# Patient Record
(demographics unavailable — no encounter records)

---

## 2019-06-25 NOTE — XRAY
Indication: Pain following lifting injury.



Comparison: None



2 views of the right humerus demonstrates mild/moderate AC degenerative

arthropathy.  No other bony, articular, or soft tissue abnormalities.

## 2019-06-25 NOTE — ERPHSYRPT
- History of Present Illness


Time Seen by Provider: 06/25/19 14:10


Source: patient


Exam Limitations: no limitations


Patient Subjective Stated Complaint: Pt states "I had surgery on my right elbow 

a month ago and it hurts on my right shoulder and my right elbow.  I took a 

norco last night and now  I cannot stay awake, I am tired.  I did not sleep at 

all last night."


Triage Nursing Assessment: Pt presented lethargic, placed in room 4, pt FSBS 77 

upon arrival.  Pt able to speak in clear full sentences. Pt closes eyes and 

sleeps between questions.


Physician History: 





56-year-old white male who states he had surgery on his right elbow recently he 

arrives with complaint of pain in his right shoulder right humerus states it 

began yesterday lifting up a garage door.


Pain is worse with movement.





Patient does appear to have somewhat slurred speech he states that this is 

normal for him he states he gets this way whenever he takes pain medications.


He states he does not want his somnolence worked up.  Is not having shortness 

of breath or any other problems.





He does state she took a Norco yesterday this is highly ask when he takes Norco.





Past medical history includes cataracts, diabetes type 1, COPD, hyperlipidemia, 

high blood pressure, myocardial infarction, GERD, anxiety, depression.





Past surgical history includes cholecystectomy, hernia, cardiac stent, left 

elbow surgery, right elbow surgery.








Timing/Duration: today


Severity: moderate


Modifying Factors: Improves With: nothing


Associated Symptoms: No nausea, No vomiting, No abdominal pain, No shortness of 

breath, No heartburn, No diaphoresis, No cough, No chills, No chest pain, No 

fever, No headaches, No loss of appetite, No malaise, No rash, No syncope, No 

seizure, No weakness


Allergies/Adverse Reactions: 








Penicillins Allergy (Severe, Verified 06/25/19 14:00)


 Swelling


ondansetron [From Zofran] Allergy (Intermediate, Verified 06/25/19 14:00)


 Headache


tramadol Adverse Reaction (Severe, Verified 06/25/19 14:00)


 hallucinate


codeine Adverse Reaction (Mild, Verified 06/25/19 14:00)


 abdominal pain





Home Medications: 








Atorvastatin Calcium 80 mg PO DAILY 06/25/19 [History]


Carvedilol 12.5 mg PO DAILY 06/25/19 [History]


Ergocalciferol (Vitamin D2) [Vitamin D] 50,000 unit PO WEEKLY 06/25/19 [History]


Gabapentin 400 mg PO DAILY 06/25/19 [History]


Linagliptin [Tradjenta] 5 mg PO DAILY 06/25/19 [History]


Liraglutide [Victoza 2-Miguel] 1.8 mg SQ DAILY 06/25/19 [History]


PANTOPRAZOLE 40 mg Tablet*** [Protonix 40MG Tablet***] 40 mg PO DAILY 06/25/19 [

History]





Hx Tetanus, Diphtheria Vaccination/Date Given: Yes


Hx Influenza Vaccination/Date Given: Yes


Hx Pneumococcal Vaccination/Date Given: No


Immunizations Up to Date: Yes





- Review of Systems


Constitutional: No Fever, No Chills


Eyes: No Symptoms


Ears, Nose, & Throat: No Symptoms


Respiratory: No Cough, No Dyspnea


Cardiac: No Chest Pain, No Edema, No Syncope


Abdominal/Gastrointestinal: No Abdominal Pain, No Nausea, No Vomiting, No 

Diarrhea


Genitourinary Symptoms: No Dysuria


Musculoskeletal: Other (right shoulder and arm pain)


Skin: No Rash


Neurological: No Dizziness, No Focal Weakness, No Sensory Changes


Psychological: No Symptoms


Endocrine: No Symptoms


All Other Systems: Reviewed and Negative





- Past Medical History


Pertinent Past Medical History: Yes


Neurological History: No Pertinent History


ENT History: Cataracts


Cardiac History: High Cholesterol, Hypertension, Myocardial Infarction (MI)


Respiratory History: COPD


Endocrine Medical History: Diabetes Type I


Musculoskeletal History: No Pertinent History


GI Medical History: GERD


 History: No Pertinent History


Psycho-Social History: Anxiety, Depression


Male Reproductive Disorders: No Pertinent History





- Past Surgical History


Past Surgical History: Yes


Other Surgical History: windy.  hernia.  cardiac stents.  left elbow.  right 

elbow





- Social History


Smoking Status: Current every day smoker


How long have you smoked: years


Exposure to second hand smoke: Yes


Drug Use: none


Patient Lives Alone: No





- Nursing Vital Signs


Nursing Vital Signs: 


 Initial Vital Signs











Temperature  98.8 F   06/25/19 13:52


 


Pulse Rate  80   06/25/19 13:52


 


Respiratory Rate  16   06/25/19 13:52


 


Blood Pressure  162/102   06/25/19 13:52


 


O2 Sat by Pulse Oximetry  92 L  06/25/19 13:52








 Pain Scale











Pain Intensity                 10

















- Physical Exam


General Appearance: other (Well-developed well-nourished white male somewhat 

slurred speech alert answers questions well oriented x3 GCS equals 15)


Eye Exam: PERRL/EOMI, eyes nml inspection


Ears, Nose, Throat Exam: normal ENT inspection, TMs normal, pharynx normal, 

moist mucous membranes


Neck Exam: normal inspection, non-tender, supple, full range of motion


Respiratory Exam: normal breath sounds, lungs clear, No respiratory distress


Cardiovascular Exam: regular rate/rhythm, normal heart sounds, normal 

peripheral pulses, capillary refill <2 sec


Gastrointestinal/Abdomen Exam: soft, normal bowel sounds, No tenderness, No mass


Back Exam: normal inspection, normal range of motion, No CVA tenderness, No 

vertebral tenderness


Extremity Exam: No normal inspection (patient with pain with palpation right 

shoulder and right upper arm decreased range of motion right  upper extremity 

secondary to right shoulder pain, radial and hallmark pulses intact two over 

four good capillary refill right fingers sensation intact right fingers)


Neurologic Exam: alert, oriented x 3, cooperative, normal mood/affect, nml 

cerebellar function, nml station & gait, sensation nml, slurred speech, No 

motor deficits


Skin Exam: normal color, warm, dry, No rash


**SpO2 Interpretation**: normal (92%)


SpO2: 92





- Radiology Exams


  ** Right Humerus


X-ray Interpretation: Discussed w/ radiologist (x-ray right humerus: Mild/

moderate a.c. degenerative arthropathy.  No other bony, articular, or soft 

tissue abnormalities)





  ** Right Shoulder


X-ray Interpretation: Discussed w/ radiologist (x-ray right shoulder: Mild/

moderately she degenerative arthropathy.  No other bony, articular, or soft 

tissue abnormalities)


Ordered Tests: 


 Active Orders 24 hr











 Category Date Time Status


 


 Sling Application STAT Care  06/25/19 15:04 Active


 


 HUMERUS Stat Exams  06/25/19 14:17 Completed


 


 SHOULDER Stat Exams  06/25/19 14:16 Completed














- Progress


Progress: improved


Progress Note: 





06/25/19 15:43


Patient x-ray of right shoulder right humerus negative for fractures patient 

does have some osteoarthritis.





Patient did seem somewhat somnolent he states this is normal for him and he 

does not want workup for this he states he gets this way when he takes his 

Norco.


Will go ahead and place a sling on the patient's right arm.


Patient has been advised to followup with his family Dr or orthopedist. for 

recheck.


He is to take Norco as prescribed by his family doctor I advised possibly 

taking a half a dose or Tylenol.


He is return for acute distress or for severe symptoms





- Departure


Departure Disposition: Home


Clinical Impression: 


Right shoulder pain


Qualifiers:


 Chronicity: acute Qualified Code(s): M25.511 - Pain in right shoulder





Right shoulder strain


Qualifiers:


 Encounter type: initial encounter Qualified Code(s): S46.911A - Strain of 

unspecified muscle, fascia and tendon at shoulder and upper arm level, right arm

, initial encounter





Condition: Stable


Critical Care Time: No


Referrals: 


PETRA APODACA MD [Primary Care Provider] - 


Additional Instructions: 


Return home.


Wear sling right arm 48-72 hours.


Cold packs right arm 24-48 hours.


Followup with your family Dr. or orthopedist call for an appointment.


Norco as prescribed by your orthopedist consider half dose.


Or consider Tylenol every 4 hours as alternative.


Return for acute distress or for severe symptoms.


Or for any problems.

## 2019-06-25 NOTE — XRAY
Indication: Pain following lifting injury.



Comparison: None



3 views of the right shoulder demonstrates mild/moderate AC degenerative

arthropathy.  No other bony, articular, or soft tissue abnormalities.

## 2019-11-28 NOTE — ERPHSYRPT
- History of Present Illness


Time Seen by Provider: 11/28/19 17:45


Source: patient, family


Exam Limitations: no limitations


Patient Subjective Stated Complaint: here for right shoulder pain since 

yesterday, no injury


Triage Nursing Assessment: pt alert, walked in, resp easy, skin w/d/p. pt 

refuses to move shoulder due to pain,


Physician History: 





patient complains of pain in the right scapular area and right shoulder area 

since yesterday.  Denies any injury.  Pain gets worse with the movement of the 

right upper extremity.


Timing/Duration: yesterday


Severity: severe


Modifying Factors: Improves With: movement


Associated Symptoms: No nausea, No vomiting, No abdominal pain, No shortness of 

breath, No heartburn, No diaphoresis, No cough, No headaches, No loss of 

appetite


Allergies/Adverse Reactions: 








Penicillins Allergy (Severe, Verified 11/28/19 17:43)


 Swelling


ondansetron [From Zofran] Allergy (Intermediate, Verified 11/28/19 17:43)


 Headache


tramadol Adverse Reaction (Severe, Verified 11/28/19 17:43)


 hallucinate


codeine Adverse Reaction (Mild, Verified 11/28/19 17:43)


 abdominal pain





Home Medications: 








Atorvastatin Calcium 80 mg PO DAILY 06/25/19 [History]


Carvedilol 12.5 mg PO DAILY 06/25/19 [History]


Ergocalciferol (Vitamin D2) [Vitamin D] 50,000 unit PO WEEKLY 06/25/19 [History]


Gabapentin 400 mg PO DAILY 06/25/19 [History]


Linagliptin [Tradjenta] 5 mg PO DAILY 06/25/19 [History]


Liraglutide [Victoza 2-Miguel] 1.8 mg SQ DAILY 06/25/19 [History]


PANTOPRAZOLE 40 mg Tablet*** [Protonix 40MG Tablet***] 40 mg PO DAILY 06/25/19 [

History]





Hx Tetanus, Diphtheria Vaccination/Date Given: No


Hx Influenza Vaccination/Date Given: Yes


Hx Pneumococcal Vaccination/Date Given: No


Immunizations Up to Date: Yes





- Review of Systems


Constitutional: No Fever, No Chills


Eyes: No Symptoms


Ears, Nose, & Throat: No Symptoms


Respiratory: No Cough, No Dyspnea


Cardiac: No Chest Pain, No Edema, No Syncope


Abdominal/Gastrointestinal: No Abdominal Pain, No Nausea, No Vomiting, No 

Diarrhea


Genitourinary Symptoms: No Dysuria


Musculoskeletal: Other (rright scapular area and right shoulder pain, increases 

with movement of the right upper extremity.), No Back Pain, No Neck Pain


Skin: No Rash


Neurological: No Dizziness, No Focal Weakness, No Sensory Changes


Psychological: No Symptoms


Endocrine: No Symptoms


All Other Systems: Reviewed and Negative





- Past Medical History


Pertinent Past Medical History: Yes


Neurological History: No Pertinent History


ENT History: Cataracts


Cardiac History: Coronary Artery Disease, High Cholesterol, Hypertension, 

Myocardial Infarction (MI)


Respiratory History: COPD


Endocrine Medical History: Diabetes Type I


Musculoskeletal History: No Pertinent History


GI Medical History: GERD


 History: No Pertinent History


Psycho-Social History: Anxiety, Depression


Male Reproductive Disorders: No Pertinent History





- Past Surgical History


Past Surgical History: Yes


Cardiac: Cardiac Catheterization, Cardiac Stent


Other Surgical History: windy.  hernia.  cardiac stents.  left elbow.  right 

elbow





- Social History


Smoking Status: Current every day smoker


How long have you smoked: years


Exposure to second hand smoke: Yes


Drug Use: none


Patient Lives Alone: No





- Nursing Vital Signs


Nursing Vital Signs: 


 Initial Vital Signs











Temperature  97.2 F   11/28/19 17:35


 


Pulse Rate  83   11/28/19 17:35


 


Respiratory Rate  16   11/28/19 17:35


 


Blood Pressure  137/80   11/28/19 17:35


 


O2 Sat by Pulse Oximetry  93 L  11/28/19 17:35








 Pain Scale











Pain Intensity                 10

















- Physical Exam


General Appearance: no apparent distress, alert


Eye Exam: PERRL/EOMI, eyes nml inspection


Ears, Nose, Throat Exam: normal ENT inspection, TMs normal, pharynx normal, 

moist mucous membranes


Neck Exam: normal inspection, non-tender, supple, full range of motion


Respiratory Exam: normal breath sounds, lungs clear, No respiratory distress


Cardiovascular Exam: regular rate/rhythm, normal heart sounds, normal 

peripheral pulses


Gastrointestinal/Abdomen Exam: soft, normal bowel sounds, No tenderness, No mass


Back Exam: normal inspection, normal range of motion, other (rright scapular 

area and right shoulder pain, increases with movement of the right upper 

extremity.nnormal distal neurovascular function), No CVA tenderness, No 

vertebral tenderness


Extremity Exam: normal inspection, normal range of motion, pelvis stable, other 

(rright scapular area and right shoulder pain, increases with movement of the 

right upper extremity.nnormal distal neurovascular function)


Neurologic Exam: alert, oriented x 3, cooperative, normal mood/affect, nml 

cerebellar function, nml station & gait, sensation nml, No motor deficits


Skin Exam: normal color, warm, dry, No rash


Lymphatic Exam: No adenopathy


SpO2: 93





Procedures





- Additional Procedures


Progress: 





right scapular area trigger point injection done with a 5 mL of 1% lidocaine 

and 4 mg of Decadron under aseptic precautions.  No complications.  Patient 

tolerated the procedure well.


Ordered Tests: 


Medication Summary














Discontinued Medications














Generic Name Dose Route Start Last Admin





  Trade Name Sonam  PRN Reason Stop Dose Admin


 


Hydrocodone Bitart/Acetaminophen  1 tab  11/28/19 17:52  





  Norco 5/325 Mg***  PO  11/28/19 17:53  





  STAT ONE   





     





     





     





     


 


Hydrocodone Bitart/Acetaminophen  Confirm  11/28/19 18:10  





  Norco 5/325 Mg***  Administered  11/28/19 18:11  





  Dose   





  1 tab   





  .ROUTE   





  .STK-MED ONE   





     





     





     





     


 


Cyclobenzaprine HCl  10 mg  11/28/19 17:53  





  Cyclobenzaprine 10 Mg***  PO  11/28/19 17:54  





  STAT ONE   





     





     





     





     


 


Cyclobenzaprine HCl  Confirm  11/28/19 18:10  





  Cyclobenzaprine 10 Mg***  Administered  11/28/19 18:11  





  Dose   





  10 mg   





  .ROUTE   





  .STK-MED ONE   





     





     





     





     


 


Dexamethasone Sodium Phosphate  4 mg  11/28/19 17:51  





  Decadron 4 Mg Inj***  IM  11/28/19 17:52  





  STAT ONE   





     





     





     





     


 


Dexamethasone Sodium Phosphate  Confirm  11/28/19 18:10  





  Decadron 4 Mg Inj***  Administered  11/28/19 18:11  





  Dose   





  4 mg   





  .ROUTE   





  .STK-MED ONE   





     





     





     





     


 


Ketorolac Tromethamine  30 mg  11/28/19 17:52  





  Toradol 30 Mg Injection***  IM  11/28/19 17:53  





  STAT ONE   





     





     





     





     


 


Ketorolac Tromethamine  Confirm  11/28/19 18:10  





  Toradol 30 Mg Injection***  Administered  11/28/19 18:11  





  Dose   





  30 mg   





  .ROUTE   





  .STK-MED ONE   





     





     





     





     


 


Lidocaine HCl  5 ml  11/28/19 17:51  





  Xylocaine 1% Hcl 20 Ml Mdv***  IJ  11/28/19 17:52  





  STAT ONE   





     





     





     





     


 


Lidocaine HCl  Confirm  11/28/19 18:11  





  Xylocaine 1% Hcl 20 Ml Mdv***  Administered  11/28/19 18:12  





  Dose   





  5 ml   





  .ROUTE   





  .STK-MED ONE   





     





     





     





     














- Progress


Progress: improved


Progress Note: 


ttrigger point injection done in the ER.  Will give patient IM Toradol and by 

mouth Lortab and by mouth Flexeril.  We'll prescribe Flexeril to go home.  

Advised patient to follow up with his PCP and return to the headache is soft 

emergency.  No life-threatening condition on discharge


11/28/19 18:25





Counseled pt/family regarding: diagnosis, need for follow-up





- Departure


Departure Disposition: Home


Clinical Impression: 


Thoracic myofascial strain


Qualifiers:


 Encounter type: initial encounter Qualified Code(s): S29.019A - Strain of 

muscle and tendon of unspecified wall of thorax, initial encounter





Right shoulder pain


Qualifiers:


 Chronicity: acute Qualified Code(s): M25.511 - Pain in right shoulder





Condition: Good


Critical Care Time: No


Referrals: 


PETRA APODACA MD [Primary Care Provider] - 11/29/19


Instructions:  Shoulder Sprain (DC), Shoulder Tendinopathy (DC), Muscle Strain


Plan of Treatment: 


see PCP as soon as possible for further diagnosis and management


Prescriptions: 


Cyclobenzaprine HCl [Flexeril] 5 mg PO BID 5 Days #10 tablet

## 2020-01-01 NOTE — XRAY
Indication: Fever and cough.



Comparison: January 31, 2009.



Portable chest remains hyperinflated clear.  Heart and mediastinal structures

within normal limits.  Bony thorax intact again with mild degenerative changes.



Impression: Nonacute hyperinflated chest.



Comment: Preliminary interpretation was made by [facility].  No critical discrepancy.

## 2020-01-01 NOTE — ERPHSYRPT
- History of Present Illness


Time Seen by Provider: 01/01/20 14:40


Source: patient


Exam Limitations: no limitations


Patient Subjective Stated Complaint: hypertenstion for the past couple of days, 

headache, cough, sneezing, chills, achy all over, states that he just hasn't 

felt good for a few days


Triage Nursing Assessment: Pt brought to the ER via EMS, hypertensive, afebrile

, rates generalized pain 8/10, no edema, denies diarrhea, last intake at 0900


Physician History: 





the patient presents with flu-like symptoms that started last Sunday, 12/29/19.


Symptoms include HA, cough, sore throat, myalgias, rhinorrhea and sinus 

congestion.


Also, endorses chills and subjective fever.


He has not taken any OTC medications to include APAP and/or ibuprofen for his 

symptoms.


Arrives via ambulance and called from home. 


Denies CP, productive cough, nausea, vomiting, and diarrhea.


His is worried about his elevated BP now that it has been taken and recorded.  


Associated Symptoms: fever, headaches, malaise, No nausea, No vomiting, No 

chest pain


Allergies/Adverse Reactions: 








Penicillins Allergy (Severe, Verified 01/01/20 14:21)


 Swelling


ondansetron [From Zofran] Allergy (Intermediate, Verified 01/01/20 14:21)


 Headache


tramadol Adverse Reaction (Severe, Verified 01/01/20 14:21)


 hallucinate


codeine Adverse Reaction (Mild, Verified 01/01/20 14:21)


 abdominal pain





Home Medications: 








Atorvastatin Calcium 80 mg PO DAILY 06/25/19 [History]


Carvedilol 12.5 mg PO DAILY 06/25/19 [History]


Ergocalciferol (Vitamin D2) [Vitamin D] 50,000 unit PO WEEKLY 06/25/19 [History]


Gabapentin 400 mg PO DAILY 06/25/19 [History]


Linagliptin [Tradjenta] 5 mg PO DAILY 06/25/19 [History]


Liraglutide [Victoza 2-Miguel] 1.8 mg SQ DAILY 06/25/19 [History]


PANTOPRAZOLE 40 mg Tablet*** [Protonix 40MG Tablet***] 40 mg PO DAILY 06/25/19 [

History]


Aspirin EC 81 mg*** [Ecotrin 81 mg***] 81 mg PO DAILY 01/01/20 [History]


Bumetanide 1 mg*** [Bumex 1 mg***] 1 mg PO DAILY 01/01/20 [History]


Cyclobenzaprine HCl [Flexeril] 10 mg PO TID 01/01/20 [History]


Empagliflozin [Jardiance] 10 mg PO DAILY 01/01/20 [History]


Famotidine 20 mg*** [Pepcid 20 MG***] 20 mg PO DAILY 01/01/20 [History]


Linaclotide [Linzess] 72 mcg PO DAILY 01/01/20 [History]


Lubiprostone [Amitiza] 24 mcg PO DAILY 01/01/20 [History]


Nitroglycerin 0.4 mg Tablet*** [Nitrostat 0.4 MG Tablet***] 0.4 mg SL UD 01/01/ 20 [History]





Hx Tetanus, Diphtheria Vaccination/Date Given: No


Hx Influenza Vaccination/Date Given: Yes


Hx Pneumococcal Vaccination/Date Given: No





- Review of Systems


Constitutional: Fever, Chills, Fatigue


Eyes: No Symptoms


Ears, Nose, & Throat: Ear Pain, Sinus Drainage, Throat Pain


Respiratory: Cough, No Cyanosis, No Dyspnea, No Wheezing


Cardiac: No Chest Pain, No Edema


Abdominal/Gastrointestinal: No Nausea, No Vomiting


Genitourinary Symptoms: No Symptoms


Musculoskeletal: Myalgias


Skin: No Symptoms


Neurological: Headache


All Other Systems: Reviewed and Negative





- Past Medical History


Pertinent Past Medical History: Yes


Neurological History: No Pertinent History


ENT History: Cataracts


Cardiac History: Coronary Artery Disease, High Cholesterol, Hypertension, 

Myocardial Infarction (MI)


Respiratory History: COPD


Endocrine Medical History: Diabetes Type I


Musculoskeletal History: No Pertinent History


GI Medical History: GERD


 History: No Pertinent History


Psycho-Social History: Anxiety, Depression


Male Reproductive Disorders: No Pertinent History





- Past Surgical History


Past Surgical History: Yes


Cardiac: Cardiac Catheterization, Cardiac Stent


Other Surgical History: windy.  hernia.  cardiac stents.  left elbow.  right 

elbow





- Social History


Smoking Status: Current every day smoker


How long have you smoked: years


Exposure to second hand smoke: Yes


Drug Use: none


Patient Lives Alone: No





- Nursing Vital Signs


Nursing Vital Signs: 


 Initial Vital Signs











Temperature  97.5 F   01/01/20 14:10


 


Pulse Rate  79   01/01/20 14:10


 


Respiratory Rate  12   01/01/20 14:10


 


Blood Pressure  166/101   01/01/20 14:10


 


O2 Sat by Pulse Oximetry  96   01/01/20 14:10








 Pain Scale











Pain Intensity                 8

















- Physical Exam


General Appearance: no apparent distress


Eye Exam: PERRL/EOMI, eyes nml inspection


Ears, Nose, Throat Exam: pharynx normal, moist mucous membranes, No pharyngeal 

erythema, No tonsillar exudate


Neck Exam: normal inspection, non-tender, supple, No meningismus


Respiratory Exam: normal breath sounds, lungs clear, airway intact, No chest 

tenderness, No respiratory distress, No diminished breath sounds, No accessory 

muscle use


Cardiovascular Exam: regular rate/rhythm, normal heart sounds, normal 

peripheral pulses, capillary refill >3 sec, No murmur, No friction rub, No 

gallop, No tachycardia


Gastrointestinal/Abdomen Exam: soft, No tenderness


Rectal Exam: deferred


Back Exam: normal inspection


Extremity Exam: normal inspection, No calf tenderness, No swelling


Skin Exam: normal color, warm, dry, No rash


**SpO2 Interpretation**: normal


SpO2: 96


O2 Delivery: Room Air





- Course


Nursing assessment & vital signs reviewed: Yes





- Radiology Exams


  ** Chest


X-ray Interpretation: Interpreted by me, Reviewed by me, Teleradiologist Report 

(Changes consistent with COPD), Negative


Ordered Tests: 


 Active Orders 24 hr











 Category Date Time Status


 


 PO Fluid Challenge STAT Care  01/01/20 14:53 Active


 


 CHEST 1 VIEW (PORTABLE) Stat Exams  01/01/20 16:04 Completed








Medication Summary














Discontinued Medications














Generic Name Dose Route Start Last Admin





  Trade Name Telloq  PRN Reason Stop Dose Admin


 


Acetaminophen  975 mg  01/01/20 14:52  01/01/20 15:25





  Tylenol 325 Mg***  PO  01/01/20 14:53  975 mg





  STAT ONE   Administration





     





     





     





     


 


Acetaminophen  Confirm  01/01/20 15:25  





  Tylenol 325 Mg***  Administered  01/01/20 15:26  





  Dose   





  975 mg   





  .ROUTE   





  .STK-MED ONE   





     





     





     





     














- Progress


Progress: unchanged, re-examined


Counseled pt/family regarding: diagnosis, need for follow-up





- Departure


Departure Disposition: Home


Clinical Impression: 


 Viral URI with cough





Condition: Stable


Critical Care Time: No


Referrals: 


PETRA APODACA MD [Primary Care Provider] - 


Instructions:  Viral Upper Respiratory Infection, Adult (DC)


Plan of Treatment: 


Nontoxic in appearance.  Patient presents with flu-like illness > 48hrs symptom 

duration.  Likely has mild influenza, but will not test and diagnosis is 

clinical and will not treat with antivirals given symptoms greater than 48 hrs.

  The patient's BP likely elevated due to his illness and discomfort.  He was 

provided with reassurance and discharged home with instructions to use APAP and/

or ibuprofen prn for pain and fever.


Prescriptions: 


Sodium Chloride [Saline Nasal Spray] 30 ml NS BID PRN #1 spray

## 2020-07-27 NOTE — ERPHSYRPT
- History of Present Illness


Source: patient


Exam Limitations: other (Poor historian)


Patient Subjective Stated Complaint: "I had surgery on my elbow back in December

and within the last two days there is fluid collecting on it. It feels like 

someone is hitting it with a sledge hammer."


Triage Nursing Assessment: Pt presented alert et oriented x3 answering questions

appropriately. pt ambulated to the room without complications. Pt reported 

having a surgery on his right elbow in December of 2019. Pt reported significant

swelling to the elbow with an onset of two days ago. Pt denied 

numbness/tingling. Radial pulses equal bilateral. Noted swelling to the elbow 

without any exudate. pt denied any drainage at home.


Physician History: 





56 yo wm w Edematous posterior R olecranon x 3 days. Pt denies trauma, and pain 

is a 20 on a scale of 10. He states that he had a R ulnar release last year. 


Occurred: other (3 days)


Method of Injury: unknown


Quality: constant, throbbing


Severity of Pain-Max: severe


Severity of Pain-Current: severe


Extremities Pain Location: elbow: right


Modifying Factors: Improves With: movement


Associated Symptoms: none


Allergies/Adverse Reactions: 








Penicillins Allergy (Severe, Verified 07/27/20 04:53)


   Swelling


ondansetron [From Zofran] Allergy (Intermediate, Verified 07/27/20 04:53)


   Headache


tramadol Adverse Reaction (Severe, Verified 07/27/20 04:53)


   hallucinate


codeine Adverse Reaction (Mild, Verified 07/27/20 04:53)


   abdominal pain





Home Medications: 








Atorvastatin Calcium 80 mg PO DAILY 06/25/19 [History]


Ergocalciferol (Vitamin D2) [Vitamin D] 50,000 unit PO WEEKLY 06/25/19 [History]


Gabapentin 400 mg PO DAILY 06/25/19 [History]


Linagliptin [Tradjenta] 5 mg PO DAILY 06/25/19 [History]


Liraglutide [Victoza 2-Miguel] 1.8 mg SQ DAILY 06/25/19 [History]


PANTOPRAZOLE 40 mg Tablet*** [Protonix 40MG Tablet***] 40 mg PO DAILY 06/25/19 

[History]


carvediloL [Carvedilol] 12.5 mg PO DAILY 06/25/19 [History]


Aspirin EC 81 mg*** [Ecotrin 81 mg***] 81 mg PO DAILY 01/01/20 [History]


Bumetanide 1 mg*** [Bumex 1 mg***] 1 mg PO DAILY 01/01/20 [History]


Cyclobenzaprine HCl [Flexeril] 10 mg PO TID 01/01/20 [History]


Empagliflozin [Jardiance] 10 mg PO DAILY 01/01/20 [History]


Linaclotide [Linzess] 72 mcg PO DAILY 01/01/20 [History]


Lubiprostone [Amitiza] 24 mcg PO DAILY 01/01/20 [History]


Nitroglycerin 0.4 mg Tablet*** [Nitrostat 0.4 MG Tablet***] 0.4 mg SL UD 

01/01/20 [History]





Hx Tetanus, Diphtheria Vaccination/Date Given: Yes


Hx Influenza Vaccination/Date Given: Yes


Hx Pneumococcal Vaccination/Date Given: Yes





Travel Risk





- International Travel


Have you traveled outside of the country in past 3 weeks: No





- Coronavirus Screening


Are you exhibiting any of the following symptoms?: No


Close contact with a COVID-19 positive Pt in past 14-21 Days: No





- Review of Systems


Constitutional: No Symptoms


Eyes: No Symptoms


Ears, Nose, & Throat: No Symptoms


Respiratory: No Symptoms


Cardiac: No Symptoms


Abdominal/Gastrointestinal: No Symptoms


Genitourinary Symptoms: No Symptoms


Neurological: No Symptoms


Psychological: No Symptoms


Endocrine: No Symptoms


Hematologic/Lymphatic: No Symptoms


Immunological/Allergic: No Symptoms





- Past Medical History


Pertinent Past Medical History: Yes


Neurological History: No Pertinent History


ENT History: Cataracts


Cardiac History: Coronary Artery Disease, High Cholesterol, Hypertension, My

ocardial Infarction (MI)


Respiratory History: COPD


Endocrine Medical History: Diabetes Type I


Musculoskeletal History: No Pertinent History


GI Medical History: GERD


 History: No Pertinent History


Psycho-Social History: Anxiety, Depression


Male Reproductive Disorders: No Pertinent History





- Past Surgical History


Past Surgical History: Yes


Cardiac: Cardiac Catheterization, Cardiac Stent


Other Surgical History: windy.  hernia.  cardiac stents.  left elbow.  right 

elbow





- Social History


Smoking Status: Current every day smoker


How long have you smoked: years


Exposure to second hand smoke: Yes


Drug Use: none


Patient Lives Alone: No


Significant Family History: no pertinent family hx





- Nursing Vital Signs


Nursing Vital Signs: 


                               Initial Vital Signs











Temperature  99.1 F   07/27/20 04:49


 


Pulse Rate  88   07/27/20 04:49


 


Respiratory Rate  16   07/27/20 04:49


 


Blood Pressure  174/98   07/27/20 04:49


 


O2 Sat by Pulse Oximetry  94 L  07/27/20 04:49








                                   Pain Scale











Pain Intensity                 10

















- Physical Exam


General Appearance: no apparent distress


Eyes, Ears, Nose, Throat Exam: normal ENT inspection


Neck Exam: normal inspection


Cardiovascular/Respiratory Exam: regular rate/rhythm, wheezing (Occ wheeze B)


Abdominal Exam: non-tender, soft


Back Exam: normal inspection, normal range of motion


Shoulder Exam: normal inspection


Elbow/Forearm Exam: swelling (R posterior olecranon w edema/mild erythema/small 

abrasion/Good radial pulse, distal sensation, and capillary return)


Wrist Exam: normal inspection


Hand Exam: normal inspection


Neuro/Tendon Exam: normal sensation, normal motor functions, normal tendon 

functions


Mental Status Exam: alert, oriented x 3, cooperative


Skin Exam: normal color, warm, dry


**SpO2 Interpretation**: normal


SpO2: 94


O2 Delivery: Room Air





- Course


Nursing assessment & vital signs reviewed: Yes


Ordered Tests: 


Medication Summary














Discontinued Medications














Generic Name Dose Route Start Last Admin





  Trade Name Telloq  PRN Reason Stop Dose Admin


 


Ketorolac Tromethamine  60 mg  07/27/20 05:18  07/27/20 05:23





  Toradol 30 Mg Injection***  IM  07/27/20 05:19  60 mg





  STAT ONE   Administration


 


Ketorolac Tromethamine  Confirm  07/27/20 05:22 





  Toradol 30 Mg Injection***  Administered  07/27/20 05:23 





  Dose  





  60 mg  





  .ROUTE  





  .STK-MED ONE  














- Progress


Progress: improved


Progress Note: 





07/27/20 05:28


Inflamed bursa has mild erythema, so pt started on doxycycline 100mg bid


Counseled pt/family regarding: need for follow-up





- Departure


Departure Disposition: Home


Clinical Impression: 


 Olecranon bursitis





Condition: Stable


Critical Care Time: No


Referrals: 


PETRA APODACA MD [Primary Care Provider] - 


Instructions:  Olecranon Bursitis (DC)


Additional Instructions: 


Follow up with orthopedic surgeon in AM


Toradol as needed for pain


Start Doxycycline twice a day


Return to ER for increased swelling/redness/temperature greater than 100.5


Prescriptions: 


Doxycycline Monohydrate 100 mg PO BID #14 tablet


Ketorolac Tromethamine [Toradol] 10 mg PO TID PRN #10 tablet

## 2021-08-20 NOTE — ERPHSYRPT
- History of Present Illness


Time Seen by Provider: 08/20/21 21:57


Historian: patient


Exam Limitations: no limitations


Physician History: 





58 years old male with a history of coronary artery disease status post 

stenting, hypertension, hyperlipidemia, tobacco abuse, chronic respiratory 

failure from COPD on 2 L presented in the ER with chief complaint of left-sided 

chest pain since yesterday evening after ballgame.  Patient is right-handed I 

did not exert with left hand.  Patient is complaining of pain in the left 

shoulder and left side of her chest with radiation to left arm severe intensity 

sharp nature with some aggravation with movements of left shoulder.  Has s

hortness of breath at his baseline which is not any worse than usual and uses 2 

L oxygen.  Patient was 90% on room air and 95% on 2 L.  No fever chills cough or

sick contact reported.


Timing/Duration: yesterday, constant, gradual onset, worse


Activities at Onset: activity


Quality: sharpness


Location: shoulder


Chest Pain Radiation: arm


Severity of Pain-Max: severe


Severity of Pain-Current: severe


Modifying Factors: Improves With: nothing


Associated Symptoms: palpitations, shortness of breath


Prior Chest Pain/Cardiac Workup: heart attack


Nitro Today/Relief: no nitro taken today


Aspirin Treatment Today: unknown


Allergies/Adverse Reactions: 








Penicillins Allergy (Severe, Verified 08/20/21 22:31)


   Swelling


ondansetron [From Zofran] Allergy (Intermediate, Verified 08/20/21 22:31)


   Headache


tramadol Adverse Reaction (Severe, Verified 08/20/21 22:31)


   hallucinate


codeine Adverse Reaction (Mild, Verified 08/20/21 22:31)


   abdominal pain





Home Medications: 








Atorvastatin Calcium 80 mg PO DAILY 06/25/19 [History]


Ergocalciferol (Vitamin D2) [Vitamin D] 50,000 unit PO WEEKLY 06/25/19 [History]


Gabapentin 400 mg PO HS 06/25/19 [History]


Linagliptin [Tradjenta] 5 mg PO DAILY 06/25/19 [History]


PANTOPRAZOLE 40 mg Tablet*** [Protonix 40MG Tablet***] 40 mg PO DAILY 06/25/19 

[History]


carvediloL [Carvedilol] 12.5 mg PO DAILY 06/25/19 [History]


Bumetanide 1 mg*** [Bumex 1 mg***] 1 mg PO DAILY 01/01/20 [History]


Cyclobenzaprine HCl [Flexeril] 10 mg PO TID 01/01/20 [History]


Empagliflozin [Jardiance] 10 mg PO DAILY 01/01/20 [History]


Linaclotide [Linzess] 72 mcg PO DAILY 01/01/20 [History]


Nitroglycerin 0.4 mg Tablet*** [Nitrostat 0.4 MG Tablet***] 0.4 mg SL UD 

01/01/20 [History]


Amlodipine Besylate 5 mg*** [Norvasc 5 mg***] 5 mg PO DAILY 08/20/21 [History]


Budesonide/Formoterol Fumarate [Budesonide-Formoterol 160-4.5] 2 puff PO BID 

08/20/21 [History]


Dulaglutide [Trulicity] 0.75 mg SQ WEEKLY 08/20/21 [History]


Evolocumab [Repatha Syringe] 140 mg IM WEEKLY 08/20/21 [History]


Famotidine 20 mg*** [Pepcid 20 MG***] 20 mg PO DAILY 08/20/21 [History]


Lipase/Protease/Amylase [Creon Dr 24,000 Units Capsule] 2 cap PO TID 08/20/21 

[History]


Tiotropium Bromide Inhaler*** [Spiriva 18 Mcg/Cap Inhaler***] 18 mcg PO DAILY 

08/20/21 [History]





Hx Tetanus, Diphtheria Vaccination/Date Given: Yes


Hx Influenza Vaccination/Date Given: Yes


Hx Pneumococcal Vaccination/Date Given: Yes





- Review of Systems


Constitutional: No Symptoms


Eyes: No Symptoms


Ears, Nose, & Throat: No Symptoms


Respiratory: Dyspnea, Wheezing


Cardiac: Chest Pain, Palpitations


Abdominal/Gastrointestinal: No Symptoms


Genitourinary Symptoms: No Symptoms


Musculoskeletal: Arthralgias, Neck Pain


Skin: No Symptoms


Neurological: No Symptoms


Psychological: No Symptoms


Endocrine: No Symptoms


Hematologic/Lymphatic: No Symptoms


Immunological/Allergic: No Symptoms





- Past Medical History


Pertinent Past Medical History: Yes


Neurological History: No Pertinent History


ENT History: Cataracts


Cardiac History: Other


Respiratory History: COPD


Endocrine Medical History: Diabetes Type II


Musculoskeletal History: No Pertinent History


GI Medical History: GERD


 History: No Pertinent History


Psycho-Social History: Anxiety, Depression


Male Reproductive Disorders: No Pertinent History


Other Medical History: 3 STENTS TO HEART AND RIGHT LEG, HAS HAD 2 HEART ATTACKS 

THAT WERE 2 YEARS APART,  HAS AN EATING PROBLEM





- Past Surgical History


Past Surgical History: Yes


Cardiac: Cardiac Catheterization, Cardiac Stent


Other Surgical History: windy.  hernia.  cardiac stents.  left elbow.  right 

elbow





- Social History


Smoking Status: Current every day smoker


How long have you smoked: years


Exposure to second hand smoke: Yes


Drug Use: none


Patient Lives Alone: No


Significant Family History: no pertinent family hx





- Nursing Vital Signs


Nursing Vital Signs: 


                               Initial Vital Signs











Temperature  97.6 F   08/20/21 22:18


 


Pulse Rate  78   08/20/21 22:18


 


Respiratory Rate  19   08/20/21 22:18


 


Blood Pressure  130/77   08/20/21 22:18


 


O2 Sat by Pulse Oximetry  98   08/20/21 22:18








                                   Pain Scale











Pain Intensity                 8

















- Physical Exam


General Appearance: no apparent distress, alert


Eye Exam: PERRL/EOMI, eyes nml inspection


Ears, Nose, Throat Exam: normal ENT inspection, pharyngeal erythema


Neck Exam: normal inspection, non-tender, supple, full range of motion


Respiratory Exam: chest tenderness (Minimal left upper), wheezing


Cardiovascular Exam: regular rate/rhythm, normal heart sounds


Gastrointestinal/Abdomen Exam: soft, normal bowel sounds, No tenderness


Back Exam: normal inspection, normal range of motion


Extremity Exam: normal inspection, normal range of motion, pelvis stable, other 

(Minimal tenderness left shoulder but intact range of motion)


Neurologic Exam: alert, oriented x 3, cooperative


Skin Exam: normal color


**SpO2 Interpretation**: borderline oxygenation, O2 applied


SpO2: 95


O2 Delivery: Nasal Cannula





- Course


EKG Interpreted by Me: RATE (78), Sinus Rhythm, Left Axis Deviation, LAFB, 

NORMAL INTERVALS, Non-specific ST Changes


Ordered Tests: 


                               Active Orders 24 hr











 Category Date Time Status


 


 Cardiac Monitor STAT Care  08/20/21 22:25 Active


 


 EKG-ER Only STAT Care  08/20/21 22:24 Active


 


 IV Insertion STAT Care  08/20/21 22:24 Active


 


 Oxygen-ED Only Nasal Cannula 2 lpm Care  08/20/21 22:24 Active


 


 CHEST 1 VIEW (PORTABLE) Stat Exams  08/20/21 22:24 Taken


 


 CBC W DIFF Stat Lab  08/20/21 22:34 Completed


 


 CMP Stat Lab  08/20/21 22:34 Completed


 


 NT PRO BNP Stat Lab  08/20/21 22:34 Completed


 


 TROPONIN Q3H Lab  08/20/21 22:34 Completed


 


 TROPONIN Q3H Lab  08/21/21 01:30 Ordered


 


 TROPONIN Q3H Lab  08/21/21 04:30 Ordered


 


 TROPONIN Q3H Lab  08/21/21 07:30 Ordered


 


 TROPONIN Q3H Lab  08/21/21 10:30 Ordered








Medication Summary














Discontinued Medications














Generic Name Dose Route Start Last Admin





  Trade Name Freq  PRN Reason Stop Dose Admin


 


Aspirin  324 mg  08/20/21 22:24  08/20/21 22:40





  Baby Aspirin 81 Mg Chew***  PO  08/20/21 22:25  324 mg





  STAT ONE   Administration


 


Metoclopramide HCl  10 mg  08/20/21 22:25  08/20/21 22:38





  Reglan 10 Mg/2 Ml***  IV  08/20/21 22:26  10 mg





  STAT ONE   Administration


 


Metoclopramide HCl  Confirm  08/20/21 22:37 





  Reglan 10 Mg/2 Ml***  Administered  08/20/21 22:38 





  Dose  





  10 mg  





  .ROUTE  





  .STK-MED ONE  


 


Morphine Sulfate  4 mg  08/20/21 22:24  08/20/21 22:38





  Morphine Sulfate 4 Mg Inj***  IV  08/20/21 22:25  4 mg





  STAT ONE   Administration


 


Morphine Sulfate  Confirm  08/20/21 22:36 





  Morphine Sulfate 4 Mg Inj***  Administered  08/20/21 22:37 





  Dose  





  4 mg  





  .ROUTE  





  .STK-MED ONE  











Lab/Rad Data: 


                           Laboratory Result Diagrams





                                 08/20/21 22:34 





                                 08/20/21 22:34 





                               Laboratory Results











  08/20/21 08/20/21 08/20/21 Range/Units





  22:34 22:34 22:34 


 


WBC    8.2  (4.0-10.5)  K/mm3


 


RBC    5.64 H  (4.1-5.6)  M/mm3


 


Hgb    16.1  (12.5-18.0)  gm/dl


 


Hct    50.5 H  (42-50)  %


 


MCV    89.5  ()  fl


 


MCH    28.5  (26-32)  pg


 


MCHC    31.9 L  (32-36)  g/dl


 


RDW    16.9 H  (11.5-14.0)  %


 


Plt Count    142 L  (150-450)  K/mm3


 


MPV    10.0  (7.5-11.0)  fl


 


Gran %    71.2 H  (36.0-66.0)  %


 


Eos # (Auto)    0.27  (0-0.5)  


 


Absolute Lymphs (auto)    1.18  (1.0-4.6)  


 


Absolute Monos (auto)    0.89  (0.0-1.3)  


 


Lymphocytes %    14.4 L  (24.0-44.0)  %


 


Monocytes %    10.9  (0.0-12.0)  %


 


Eosinophils %    3.3  (0.00-5.0)  %


 


Basophils %    0.2  (0.0-0.4)  %


 


Absolute Granulocytes    5.84  (1.4-6.9)  


 


Basophils #    0.02  (0-0.4)  


 


Sodium   141   (137-145)  mmol/L


 


Potassium   3.8   (3.5-5.1)  mmol/L


 


Chloride   100   ()  mmol/L


 


Carbon Dioxide   33 H   (22-30)  mmol/L


 


Anion Gap   11.7   (5-15)  MEQ/L


 


BUN   47 H   (9-20)  mg/dL


 


Creatinine   2.86 H   (0.66-1.25)  mg/dL


 


Estimated GFR   24.3   ML/MIN


 


Glucose   136 H   ()  mg/dL


 


Calcium   9.3   (8.4-10.2)  mg/dL


 


Total Bilirubin   0.40   (0.2-1.3)  mg/dL


 


AST   40   (17-59)  U/L


 


ALT   38   (0-50)  U/L


 


Alkaline Phosphatase   90   ()  U/L


 


Troponin I  < 0.012    (0.000-0.034)  ng/mL


 


NT-Pro-B Natriuret Pep   250   (0-900)  pg/mL


 


Serum Total Protein   6.7   (6.3-8.2)  g/dL


 


Albumin   3.9   (3.5-5.0)  g/dL














- Progress


Progress: improved


Air Movement: good


Progress Note: 





08/21/21 00:14


58 years old is evaluated for left shoulder and left chest pain with radiation 

to left arm.  EKG showed normal sinus rhythm without any acute ST elevations or 

depressions.  Negative initial troponins.  Chest x-ray negative for any acute 

cardiopulmonary findings reviewed by me, official report is pending.  Patient 

has chronic renal failure which is stable.  Given symptomatic treatment, on 

reevaluation feeling better.  Has multiple risk factors for CAD, discussed with 

Dr. Rodriguez and patient is being admitted for observation for ACS rule out.


Blood Culture(s) Obtained: No


Antibiotics given: No


Discussed with : Promise


Will see patient in: hospital (observation)


Counseled pt/family regarding: lab results, diagnosis, rad results, smoking 

cessation





- Departure


Departure Disposition: Observation


Clinical Impression: 


 Chest pain, rule out acute myocardial infarction





Condition: Stable


Critical Care Time: No


Referrals: 


PETRA APODACA MD [Primary Care Provider] -

## 2021-08-21 NOTE — XRAY
Indication: Chest pain.



Comparison: January 1, 2020.



Portable chest remains hyperinflated and clear.  Heart and mediastinal

structures within normal limits.  Bony thorax intact again with mild

degenerative changes.  No new/acute findings.

## 2021-08-24 NOTE — SSS
DISCHARGE DIAGNOSES: 

1) LEFT-SIDED CHEST DISCOMFORT. 

2) CHRONIC OBSTRUCTIVE PULMONARY DISEASE EXACERBATION. 

3) HISTORY OF CORONARY ARTERY DISEASE. 



HISTORY:  The patient is a 58 year-old white male patient who reports that he was playing 
softball. He was actually standing on the side of the fence getting ready to go in when he 
had issues with left-sided chest pain. He has known history of coronary artery disease and 
after discussion with his wife he felt like he needed to come into the emergency room for 
evaluation. The patient reports that by the time he was here the pain had resolved and he 
has had no pain since this stay. The patient had troponins checked multiple times with all 
less than 0.012. The patient has wheezing in his chest. He does have some issues with 
chronic obstructive pulmonary disease. He does have a specialist in [place] for both 
his heart and lung. 



PAST MEDICAL/SURGICAL HISTORY:  The patient's medical history is otherwise significant for 
two previous myocardial infarctions in roughly 2014 and 2015 each time he had two stents 
placed. He has had no problems since that time. He sees his cardiologist just once a year. 
Unfortunately the patient does continue to smoke and he is a diabetic. The patient also 
has had a cataract, hernia, surgery on his elbows. 



HOME MEDICATIONS: Currently includes amlodipine 5 mg a day, atorvastatin 80 mg a day. He 
takes budesonide formoterol fumarate inhalation, Bumex 1 mg daily, carvedilol 12.5 mg once 
a day, cyclobenzaprine 10 mg t.i.d., Trulicity 0.75 mg weekly, Jardiance 10 mg a day, 
vitamin D 50,000 units weekly, Repatha 140 mg weekly, famotidine 20 mg a day, gabapentin 
400 mg at night, linaclotide 72 mcg for bowel issues. Tradjenta 5 mg a day. Creon 24,000 
units before meals, sublingual nitroglycerin PRN, pantoprazole 40 mg a day, Spiriva 
inhaler. 



ALLERGIES:  TRAMADOL.  CODEINE.  ZOFRAN. PENICILLIN.   



PHYSICAL EXAMINATION:  The patient's vital signs on admission showed his temperature to be 
97.6F, pulse 78, respiratory rate 19 and blood pressure 130/77. O2 saturation 98%. 

HEENT:  Normocephalic, atraumatic. Pupils equal round reactive to light. He is wearing 
oxygen per nasal cannula presently. Oropharynx is slightly dry. 

NECK:  Supple without lymphadenopathy, thyromegaly or JVD. 

CHEST: Wheezes bilaterally.  

HEART: Currently regular rate and rhythm without murmurs, rubs or gallops.

ABDOMEN: Soft. No palpable masses.

EXTREMITIES:  Without cyanosis, clubbing or significant edema. 

NEUROLOGIC: The patient is alert and oriented x3 with no focal deficits.



LAB DATA AND TESTS:  The patient's studies again showed his troponins all to be less than 
0.012. His white count was 8,200, hemoglobin 16.1, PLT count 142,000. His glucose is 136, 
BUN 47, creatinine 2.86. His electrolytes were essentially normal as were his liver 
enzymes and Pro-BNP level. His COVID test was negative. 



HOSPITAL COURSE:  The patient was admitted to the [facility] where he was monitored on 
telemetry. He has had no further discomfort in his chest since that time. We do hear 
significant bilateral wheezes however. The patient has previously been on oral steroids 
for his medications and he does have an appointment to see his pulmonary doctor in the 
next month. We suggested to him that he might want to move that up and we will give him 
prednisone 30 mg a day for three days, 20 mg for three days and 10 mg for three days. He 
essentially ruled out for myocardial infarction otherwise and therefore felt to be safe to 
go home on the above medications along with his usual home medication.

## 2022-01-02 NOTE — XRAY
Indication: Syncope.  COPD.



Comparison: August 20, 2021.



PA/lateral chest again hyperinflated and clear.  Heart not enlarged.  Bony

thorax intact again with mild degenerative changes.  No new/acute findings.

## 2022-01-02 NOTE — ERPHSYRPT
- History of Present Illness


Time Seen by Provider: 01/02/22 12:06


Exam Limitations: no limitations


Patient Subjective Stated Complaint: Syncope


Triage Nursing Assessment: Patient brougth back to ED and transferred self to 

bed. Patient A+O X3. Patient's skin pink, warm and dry. Patient not sure why he 

is in ED. Patient's mother states Patient was at [employer] and she noticed him 

leaning over a [employer] [facility] not moving  and hanging on to the back of the pew. 

Patient would not respond to her. BS was checked right after and it was 145. 

Patient denies pain or discomfort.


Physician History: 





Patient is 59-year-old male with significant past medical history of type 2 

insulin-dependent diabetes mellitus hypertension was in his usual state of 

health at [facility] where he lives over the pew and was not responding patient even

did not remember what happened.  Patient's wife noted that patient is not 

responding in limping over shows she tried to stimulate him with verbal as well 

as physical stimuli but patient did not respond so they called ambulance and 

patient was brought into the emergency room in the emergency room patient was 

totally awake oriented to time place and person but he does not remember 

anything what happened and how he ended up in the emergency room.


Timing/Duration: today


Activities at Onset: none


Severity of Pain-Max: none


Severity of Pain-Current: none


Modifying Factors: Improves With: nothing


Nitro Today/Relief: no nitro taken today


Aspirin Treatment Today: no aspirin today


Associated Symptoms: syncope


Prior Chest Pain/Cardiac Workup: no prior chest pain


Allergies/Adverse Reactions: 








Penicillins Allergy (Severe, Verified 01/02/22 11:36)


   Swelling


ondansetron [From Zofran] Allergy (Intermediate, Verified 01/02/22 11:36)


   Headache


tramadol Adverse Reaction (Severe, Verified 01/02/22 11:36)


   hallucinate


codeine Adverse Reaction (Mild, Verified 01/02/22 11:36)


   abdominal pain





Home Medications: 








Atorvastatin Calcium 80 mg PO DAILY 06/25/19 [History]


Ergocalciferol (Vitamin D2) [Vitamin D] 50,000 unit PO WEEKLY 06/25/19 [History]


Gabapentin 400 mg PO HS 06/25/19 [History]


Linagliptin [Tradjenta] 5 mg PO DAILY 06/25/19 [History]


PANTOPRAZOLE 40 mg Tablet*** [Protonix 40MG Tablet***] 40 mg PO DAILY 06/25/19 

[History]


carvediloL [Carvedilol] 12.5 mg PO DAILY 06/25/19 [History]


Bumetanide 1 mg*** [Bumex 1 mg***] 1 mg PO DAILY 01/01/20 [History]


Cyclobenzaprine HCl [Flexeril] 10 mg PO TID 01/01/20 [History]


Empagliflozin [Jardiance] 10 mg PO DAILY 01/01/20 [History]


Linaclotide [Linzess] 72 mcg PO DAILY 01/01/20 [History]


Nitroglycerin 0.4 mg Tablet*** [Nitrostat 0.4 MG Tablet***] 0.4 mg SL UD 

01/01/20 [History]


Amlodipine Besylate 5 mg*** [Norvasc 5 mg***] 5 mg PO DAILY 08/20/21 [History]


Budesonide/Formoterol Fumarate [Budesonide-Formoterol 160-4.5] 2 puff PO BID 

08/20/21 [History]


Dulaglutide [Trulicity] 0.75 mg SQ WEEKLY 08/20/21 [History]


Evolocumab [Repatha Syringe] 140 mg IM WEEKLY 08/20/21 [History]


Famotidine 20 mg*** [Pepcid 20 MG***] 20 mg PO DAILY 08/20/21 [History]


Lipase/Protease/Amylase [Creon Dr 24,000 Unit Capsule] 2 cap PO TID 08/20/21 

[History]


Tiotropium Bromide Inhaler*** [Spiriva 18 Mcg/Cap Inhaler***] 18 mcg PO DAILY 

08/20/21 [History]





Hx Tetanus, Diphtheria Vaccination/Date Given: Yes


Hx Influenza Vaccination/Date Given: Yes


Hx Pneumococcal Vaccination/Date Given: Yes


Immunizations Up to Date: Yes





Travel Risk





- International Travel


Have you traveled outside of the country in past 3 weeks: No





- Coronavirus Screening


Are you exhibiting any of the following symptoms?: No


Close contact with a COVID-19 positive Pt in past 14-21 Days: No





- Vaccine Status


Have you recieved a Covid-19 vaccination: Yes


: Unknown





- Vaccination Dates


Dates if Unknown: unknown





- Review of Systems


Constitutional: No Fever, No Chills


Eyes: No Symptoms


Ears, Nose, & Throat: No Symptoms


Respiratory: No Cough, No Dyspnea


Cardiac: No Chest Pain, No Edema, No Syncope


Abdominal/Gastrointestinal: No Abdominal Pain, No Nausea, No Vomiting, No 

Diarrhea


Genitourinary Symptoms: No Dysuria


Musculoskeletal: No Back Pain, No Neck Pain


Skin: No Rash


Neurological: No Dizziness, No Focal Weakness, No Sensory Changes


Psychological: No Symptoms


Endocrine: No Symptoms


All Other Systems: Reviewed and Negative





- Past Medical History


Pertinent Past Medical History: Yes


Neurological History: No Pertinent History


ENT History: Cataracts


Cardiac History: Angina, Coronary Artery Disease, Other


Respiratory History: COPD


Endocrine Medical History: Diabetes Type II


Musculoskeletal History: No Pertinent History


GI Medical History: GERD


 History: No Pertinent History


Psycho-Social History: Anxiety, Depression


Male Reproductive Disorders: No Pertinent History


Other Medical History: 3 STENTS TO HEART AND RIGHT LEG, HAS HAD 2 HEART ATTACKS 

THAT WERE 2 YEARS APART





- Past Surgical History


Past Surgical History: Yes


Cardiac: Cardiac Catheterization, Cardiac Stent


Gastrointestinal: Cholecystectomy, Hernia Repair


Other Surgical History: windy.  hernia.  cardiac stents.  left elbow.  right 

elbow





- Social History


Smoking Status: Current every day smoker


How long have you smoked: 40 years


Exposure to second hand smoke: Yes


Drug Use: none


Patient Lives Alone: No


Significant Family History: no pertinent family hx





- Nursing Vital Signs


Nursing Vital Signs: 


                               Initial Vital Signs











Temperature  98.0 F   01/02/22 11:36


 


Pulse Rate  75   01/02/22 11:36


 


Respiratory Rate  18   01/02/22 11:36


 


Blood Pressure  177/99   01/02/22 11:36


 


O2 Sat by Pulse Oximetry  94 L  01/02/22 11:36








                                   Pain Scale











Pain Intensity                 0

















- Physical Exam


General Appearance: no apparent distress, alert


Eye Exam: PERRL/EOMI, eyes nml inspection


Ears, Nose, Throat Exam: normal ENT inspection, moist mucous membranes


Neck Exam: normal inspection, non-tender, supple


Respiratory Exam: normal breath sounds, lungs clear, No respiratory distress


Cardiovascular Exam: regular rate/rhythm, normal heart sounds, No edema


Gastrointestinal/Abdomen Exam: soft, No tenderness, No mass


Back Exam: normal inspection, No CVA tenderness, No vertebral tenderness


Extremity Exam: normal inspection, normal range of motion


Neurologic Exam: alert, oriented x 3, cooperative, normal mood/affect, nml 

cerebellar function, sensation nml, No motor deficits


Skin Exam: normal color, warm, dry


Lymphatic Exam: No adenopathy


SpO2: 94





- Course


Nursing assessment & vital signs reviewed: Yes


EKG Interpreted by Me: Sinus Rhythm





- Radiology Exams


  ** Chest


X-ray Interpretation: Reviewed by me, Negative, No Pneumonia, No Pneumothorax





- CT Exams


  ** Head


CT Interpretation: Tele-radiologist Report, No/Intracranial Hemorrhag


Ordered Tests: 


                               Active Orders 24 hr











 Category Date Time Status


 


 EKG-ER Only STAT Care  01/02/22 11:44 Active


 


 POCT Glucose Check STAT Care  01/02/22 11:44 Active


 


 CHEST 2 VIEWS (PA AND LAT) Stat Exams  01/02/22 11:46 Taken


 


 HEAD WITHOUT CONTRAST [CT] Stat Exams  01/02/22 11:45 Taken


 


 CBC W DIFF Stat Lab  01/02/22 12:00 Completed


 


 CMP Stat Lab  01/02/22 12:00 Completed


 


 Manual Differential NC Stat Lab  01/02/22 12:00 Completed


 


 POCT GLUCOSE Stat Lab  01/02/22 11:40 Completed


 


 UA W/RFX UR CULTURE Stat Lab  01/02/22 12:31 Completed


 


 Urine Triage Profile Stat Lab  01/02/22 12:31 Completed








Medication Summary











Generic Name Dose Route Start Last Admin





  Trade Name Freq  PRN Reason Stop Dose Admin


 


Sodium Chloride  1,000 mls @ 50 mls/hr  01/02/22 11:45  01/02/22 11:54





  Sodium Chloride 0.9% 1000 Ml  IV  02/01/22 11:44  50 mls/hr





  .Q20H CARTER   Administration











Lab/Rad Data: 


                           Laboratory Result Diagrams





                                 01/02/22 12:00 





                                 01/02/22 12:00 





                               Laboratory Results











  01/02/22 01/02/22 01/02/22 Range/Units





  12:31 12:31 12:00 


 


WBC     (4.0-10.5)  K/mm3


 


RBC     (4.1-5.6)  M/mm3


 


Hgb     (12.5-18.0)  gm/dl


 


Hct     (42-50)  %


 


MCV     ()  fl


 


MCH     (26-32)  pg


 


MCHC     (32-36)  g/dl


 


RDW     (11.5-14.0)  %


 


Plt Count     (150-450)  K/mm3


 


MPV     (7.5-11.0)  fl


 


Segmented Neutrophils     (36.-66.)  %


 


Lymphocytes (Manual)     (24-44)  %


 


Monocytes (Manual)     (0.0-12.0)  %


 


Eosinophils (Manual)     (0.00-3.0)  %


 


Platelet Estimate     (NORMAL)  


 


RBC Morphology     


 


Poikilocytosis     


 


Anisocytosis     


 


Sodium    140  (137-145)  mmol/L


 


Potassium    3.7  (3.5-5.1)  mmol/L


 


Chloride    99  ()  mmol/L


 


Carbon Dioxide    34 H  (22-30)  mmol/L


 


Anion Gap    9.8  (5-15)  MEQ/L


 


BUN    33 H  (9-20)  mg/dL


 


Creatinine    2.97 H  (0.66-1.25)  mg/dL


 


Estimated GFR    23.1  ML/MIN


 


Glucose    122 H  ()  mg/dL


 


POC Glucometer     (74 to 106)  mg/dL


 


Calcium    9.7  (8.4-10.2)  mg/dL


 


Total Bilirubin    0.40  (0.2-1.3)  mg/dL


 


AST    32  (17-59)  U/L


 


ALT    19  (0-50)  U/L


 


Alkaline Phosphatase    127 H  ()  U/L


 


Serum Total Protein    6.3  (6.3-8.2)  g/dL


 


Albumin    3.8  (3.5-5.0)  g/dL


 


Urine Color   STRAW   (YELLOW)  


 


Urine Appearance   CLEAR   (CLEAR)  


 


Urine pH   5.0   (5-6)  


 


Ur Specific Gravity   1.006   (1.005-1.025)  


 


Urine Protein   100   (Negative)  


 


Urine Ketones   NEGATIVE   (NEGATIVE)  


 


Urine Blood   NEGATIVE   (0-5)  Agus/ul


 


Urine Nitrite   NEGATIVE   (NEGATIVE)  


 


Urine Bilirubin   NEGATIVE   (NEGATIVE)  


 


Urine Urobilinogen   NEGATIVE   (0-1)  mg/dL


 


Ur Leukocyte Esterase   NEGATIVE   (NEGATIVE)  


 


Urine WBC (Auto)   NONE   (0-5)  /HPF


 


Urine RBC (Auto)   NONE   (0-2)  /HPF


 


U Epithel Cells (Auto)   NONE   (FEW)  /HPF


 


Urine Bacteria (Auto)   NONE   (NEGATIVE)  /HPF


 


Urine Mucus (Auto)   SLIGHT   (NEGATIVE)  /HPF


 


Urine Culture Reflexed   NO   (NO)  


 


Urine Glucose   >=500   (NEGATIVE)  mg/dL


 


Urine Opiates Level  NEGATIVE    (NEGATIVE)  


 


Ur Methadone  NEGATIVE    (NEGATIVE)  


 


Urine Barbiturates  NEGATIVE    (NEGATIVE)  


 


Ur Phencyclidine (PCP)  NEGATIVE    (NEGATIVE)  


 


Urine Amphetamine  NEGATIVE    (NEGATIVE)  


 


U Benzodiazepine Level  NEGATIVE    (NEGATIVE)  


 


Urine Cocaine  NEGATIVE    (NEGATIVE)  


 


Urine Marijuana (THC)  NEGATIVE    (NEGATIVE)  














  01/02/22 01/02/22 Range/Units





  12:00 11:40 


 


WBC  7.3   (4.0-10.5)  K/mm3


 


RBC  5.06   (4.1-5.6)  M/mm3


 


Hgb  14.8   (12.5-18.0)  gm/dl


 


Hct  46.6   (42-50)  %


 


MCV  92.1   ()  fl


 


MCH  29.2   (26-32)  pg


 


MCHC  31.8 L   (32-36)  g/dl


 


RDW  16.3 H   (11.5-14.0)  %


 


Plt Count  180   (150-450)  K/mm3


 


MPV  9.9   (7.5-11.0)  fl


 


Segmented Neutrophils  65   (36.-66.)  %


 


Lymphocytes (Manual)  24   (24-44)  %


 


Monocytes (Manual)  8   (0.0-12.0)  %


 


Eosinophils (Manual)  3   (0.00-3.0)  %


 


Platelet Estimate  NORMAL   (NORMAL)  


 


RBC Morphology  ABNORMAL   


 


Poikilocytosis  1+   


 


Anisocytosis  1+   


 


Sodium    (137-145)  mmol/L


 


Potassium    (3.5-5.1)  mmol/L


 


Chloride    ()  mmol/L


 


Carbon Dioxide    (22-30)  mmol/L


 


Anion Gap    (5-15)  MEQ/L


 


BUN    (9-20)  mg/dL


 


Creatinine    (0.66-1.25)  mg/dL


 


Estimated GFR    ML/MIN


 


Glucose    ()  mg/dL


 


POC Glucometer   171 H  (74 to 106)  mg/dL


 


Calcium    (8.4-10.2)  mg/dL


 


Total Bilirubin    (0.2-1.3)  mg/dL


 


AST    (17-59)  U/L


 


ALT    (0-50)  U/L


 


Alkaline Phosphatase    ()  U/L


 


Serum Total Protein    (6.3-8.2)  g/dL


 


Albumin    (3.5-5.0)  g/dL


 


Urine Color    (YELLOW)  


 


Urine Appearance    (CLEAR)  


 


Urine pH    (5-6)  


 


Ur Specific Gravity    (1.005-1.025)  


 


Urine Protein    (Negative)  


 


Urine Ketones    (NEGATIVE)  


 


Urine Blood    (0-5)  Agus/ul


 


Urine Nitrite    (NEGATIVE)  


 


Urine Bilirubin    (NEGATIVE)  


 


Urine Urobilinogen    (0-1)  mg/dL


 


Ur Leukocyte Esterase    (NEGATIVE)  


 


Urine WBC (Auto)    (0-5)  /HPF


 


Urine RBC (Auto)    (0-2)  /HPF


 


U Epithel Cells (Auto)    (FEW)  /HPF


 


Urine Bacteria (Auto)    (NEGATIVE)  /HPF


 


Urine Mucus (Auto)    (NEGATIVE)  /HPF


 


Urine Culture Reflexed    (NO)  


 


Urine Glucose    (NEGATIVE)  mg/dL


 


Urine Opiates Level    (NEGATIVE)  


 


Ur Methadone    (NEGATIVE)  


 


Urine Barbiturates    (NEGATIVE)  


 


Ur Phencyclidine (PCP)    (NEGATIVE)  


 


Urine Amphetamine    (NEGATIVE)  


 


U Benzodiazepine Level    (NEGATIVE)  


 


Urine Cocaine    (NEGATIVE)  


 


Urine Marijuana (THC)    (NEGATIVE)  














- Progress


Progress: improved


Air Movement: good


Progress Note: 





01/02/22 13:14


I talked to the patient and explained him in detail about his laboratory data 

which stable except for his kidney function is deteriorating which he already 

knows.  We were unable to find out the exact cause for his syncope brought I 

explained to him that we will put a Holter monitor for 24 hours and see if any 

events can be noted otherwise he will need 30 days event monitor which he can 

get it scheduled by his cardiologist or by his primary care physician.  Patient 

understood verbalize instruction and he agrees for 24-hour Holter monitor.  He 

is advised that he should stay on all the medications same and follow-up with 

his primary care physician on Monday or Tuesday and discussed with him about 

further evaluation for his syncope.


Blood Culture(s) Obtained: No


Antibiotics given: No


Counseled pt/family regarding: lab results, diagnosis, need for follow-up, rad 

results





- Departure


Departure Disposition: Home


Clinical Impression: 


 Syncope with normal neurologic examination





Condition: Stable


Critical Care Time: Yes


Critical Care Time(excluding separately billable procedures): Critical 30-74 

mins


Referrals: 


PETRA APODACA MD [Primary Care Provider] - Follow up/PCP as directed


Instructions:  Syncope (Fainting) (DC)


Additional Instructions: 


Discharge/Care Plan





SHERRI ALARCON was seen on 01/02/22 in the Emergency Room. The patient was 

counseled regarding Diagnosis,Lab results, Imaging studies, need for follow up 

and when to return to the Emergency Room.





Prescriptions given:





Discharge Note





I have spoken with the patient and/or caregivers. I have explained the patient's

condition, diagnosis and treatment plan based on the information available to me

at this time. I have answered the patient's and/or caregiver's questions and 

addressed any concerns. The patient and/or caregivers have as good understanding

of the patient's diagnosis, condition and treatment plan as can be expected at 

this point. The vital signs have been stable. The patient's condition is stable 

and appropriate for discharge from the emergency department.





The patient will pursue further outpatient evaluation with the primary care 

physician or other designated or consulting physician as outlined in the 

discharge instructions. The patient and/or caregivers are agreeable to this plan

of care and follow-up instructions have been explained in detail. The patient 

and/or caregivers have received these instruction. The patient/and or caregivers

are aware that any significant change in condition or worsening of symptoms 

should prompt an immediate return to this or the closest emergency department or

call 911. 





SHERRI ALARCON was seen on 01/02/22 n the Emergency Room. At that time you were 

treated for an emergent condition, during your visit Laboratory, Radiology 

and/or other procedures may have been ordered. It is very important that you 

follow-up with your Primary Care Physician PETRA APODACA within the next 24-48 

hours to review your Emergency Room visit and the final results of testing that 

was ordered.  Some test results such as Urine Cultures, Blood Cultures, and 

other cultures if ordered will not be finalized for 24-48 hours.





If you do not have a Primary Care Provider please call the medical records 

department at [phone] to obtain a copy of your results or you may 

sign into our patient portal to obtain these results by visiting us @ 

[email or web address] and completing the following steps:





1. Click on the Patient Portal link





2. Click the Patient Self Enrollment Link to complete the enrollment form and 

entering your Medical Record Number [id number]





3. Once the enrollment form is completed you will receive an email with a 

temporary ID and password at the email address you provided. 





4. Next choose a user name and password. Your user name must be at least 4 

characters long and your password must be at least 4 characters long.





5. Choose a security question from the list and provide your answer to the 

question.





If you already have signed into the Health Portal you may access your Health 

Care Information  24/7 by the following steps:





1. Login to  our website @ [email or web address]





2. Enter your original user name and password.





FAQS





The [facility] Health Portal is an online tool that contains your Lab Results, 

Radiology Reports, Visit History, Discharge Instructions and Health Summary 





Lab and Radiology Results will not be available for 72 hours on the portal.





The Portal is a secure site, passwords are encryted and URLs are re-written so 

they cannot be copied and pasted. You and authorized family members are the only

ones who can access your Portal. Also there is a timeout feature that protects 

your information if you leave the Portal page open.





If you have technical difficulty please use the Contact Us link on the page this

will allow you to submit any questions you have regarding the Portal or you may 

contact the Medical Record Department at [phone].





Your this ER visit we have done some essential emergent laboratory data to rule 

out emergent causes for your syncope (passing episode ).  You should contact 

your primary care physician and make an appointment in the next 2 to 3 days for 

further evaluation of your syncope.  We are going to put 24-hour Holter monitor 

which you can return tomorrow around 2:00.  We will send the Holter monitor 

report to your primary care physician for further evaluation.

## 2022-01-02 NOTE — XRAY
Indication: Syncope.



Multiple contiguous axial images obtained through the head without contrast.



Comparison: July 26, 2008.



Age-appropriate global atrophy.  No acute intracranial hemorrhage, abnormal

extra-axial fluid collection, or mass effect.  Fourth ventricle is midline

without hydrocephalus.  Bony calvarium intact.  Visualized paranasal sinuses

and mastoid air cells are clear.



Impression: Continued negative CT head without contrast exam.



Comment: Preliminary interpretation made by [facility].  No critical discrepancy.